# Patient Record
Sex: FEMALE | Race: WHITE | Employment: OTHER | URBAN - METROPOLITAN AREA
[De-identification: names, ages, dates, MRNs, and addresses within clinical notes are randomized per-mention and may not be internally consistent; named-entity substitution may affect disease eponyms.]

---

## 2018-03-25 ENCOUNTER — HOSPITAL ENCOUNTER (OUTPATIENT)
Age: 33
Discharge: HOME | End: 2018-03-25
Attending: PEDIATRICS
Payer: COMMERCIAL

## 2018-03-25 VITALS
DIASTOLIC BLOOD PRESSURE: 94 MMHG | BODY MASS INDEX: 24.16 KG/M2 | SYSTOLIC BLOOD PRESSURE: 127 MMHG | RESPIRATION RATE: 20 BRPM | HEART RATE: 79 BPM | HEIGHT: 65 IN | OXYGEN SATURATION: 99 % | TEMPERATURE: 99 F | WEIGHT: 145 LBS

## 2018-03-25 DIAGNOSIS — M54.2 CERVICALGIA: Primary | ICD-10-CM

## 2018-03-25 PROCEDURE — 99204 OFFICE O/P NEW MOD 45 MIN: CPT

## 2018-03-25 PROCEDURE — 99203 OFFICE O/P NEW LOW 30 MIN: CPT

## 2018-03-25 RX ORDER — BENZONATATE 100 MG/1
100 CAPSULE ORAL 3 TIMES DAILY PRN
COMMUNITY

## 2018-03-25 RX ORDER — METHYLDOPA 250 MG/1
250 TABLET, FILM COATED ORAL 2 TIMES DAILY
COMMUNITY

## 2018-03-25 RX ORDER — MONTELUKAST SODIUM 10 MG/1
10 TABLET ORAL NIGHTLY
COMMUNITY

## 2018-03-25 RX ORDER — CYCLOBENZAPRINE HCL 10 MG
10 TABLET ORAL 3 TIMES DAILY PRN
Qty: 20 TABLET | Refills: 0 | Status: SHIPPED | OUTPATIENT
Start: 2018-03-25 | End: 2018-04-01

## 2018-03-25 NOTE — ED INITIAL ASSESSMENT (HPI)
[facility]  and tested negative for influenza, states having neck pain x 1 week. Patient denies fever at home, productive cough, ear pressure, body aches, increased fatigue. Patient states having sore throat only with cough.   P

## 2018-03-25 NOTE — ED PROVIDER NOTES
Patient Seen in: [address]    History   Patient presents with:  Cough/URI  Neck Pain (musculoskeletal, neurologic)    Stated Complaint: neck pain    HPI    17-year-old female presents [facility] 65.8 [facility]   LMP 03/11/2018 (Approximate)   SpO2 99%   BMI 24.13 kg/m²      GENERAL: NAD  HEAD: normocephalic, atraumatic,   EYES: PERRLA, EOMI, conj sclera clear  THROAT: mmm, no lesions  NECK: supple, no meningeal signs. Full rom.   Tender traps B  LUNGS: n

## 2022-04-12 ENCOUNTER — APPOINTMENT (OUTPATIENT)
Dept: GENERAL RADIOLOGY | Age: 37
End: 2022-04-12
Attending: NURSE PRACTITIONER
Payer: COMMERCIAL

## 2022-04-12 ENCOUNTER — HOSPITAL ENCOUNTER (OUTPATIENT)
Age: 37
Discharge: HOME | End: 2022-04-12
Payer: COMMERCIAL

## 2022-04-12 VITALS
HEIGHT: 65 IN | BODY MASS INDEX: 24.16 KG/M2 | HEART RATE: 72 BPM | DIASTOLIC BLOOD PRESSURE: 92 MMHG | SYSTOLIC BLOOD PRESSURE: 134 MMHG | WEIGHT: 145 LBS | RESPIRATION RATE: 16 BRPM | OXYGEN SATURATION: 100 % | TEMPERATURE: 98 F

## 2022-04-12 DIAGNOSIS — J06.9 UPPER RESPIRATORY VIRUS: Primary | ICD-10-CM

## 2022-04-12 PROCEDURE — 71046 X-RAY EXAM CHEST 2 VIEWS: CPT | Performed by: NURSE PRACTITIONER

## 2022-04-12 PROCEDURE — 99203 OFFICE O/P NEW LOW 30 MIN: CPT | Performed by: NURSE PRACTITIONER

## 2022-04-12 RX ORDER — BENZONATATE 100 MG/1
200 CAPSULE ORAL 3 TIMES DAILY PRN
Qty: 30 CAPSULE | Refills: 0 | Status: SHIPPED | OUTPATIENT
Start: 2022-04-12 | End: 2022-05-12

## 2022-04-12 RX ORDER — LEVOTHYROXINE, LIOTHYRONINE 38; 9 UG/1; UG/1
60 TABLET ORAL
COMMUNITY
Start: 2022-03-28

## 2022-04-12 RX ORDER — LISINOPRIL 10 MG/1
10 TABLET ORAL DAILY
COMMUNITY
Start: 2022-02-10

## 2022-04-12 RX ORDER — ALBUTEROL SULFATE 90 UG/1
2 AEROSOL, METERED RESPIRATORY (INHALATION) EVERY 4 HOURS PRN
Qty: 1 EACH | Refills: 0 | Status: SHIPPED | OUTPATIENT
Start: 2022-04-12 | End: 2022-05-12

## 2022-04-12 NOTE — ED INITIAL ASSESSMENT (HPI)
Patient here with complaints of a cough that goes between dry and productive x 2-3 days. States today she was out walking and developed SOB with the walking. She has had a runny nose with yellow mucus x 1 week and some general body aches. Denies any fevers, chills, N/V/D, ear pain or other symptoms. Both kids have been sick last week and have had fevers and bilateral ear infections. Tylenol PRN, nothing today. She had COVID at [facility] 2021.

## 2022-07-01 NOTE — ED INITIAL ASSESSMENT (HPI)
Pt presents with congestion and sore throat (one day) symptoms starting 6/19/22. Per pt, \"I feel like I had a sinus infection\". Pt now reports right ear pain x 24 hours. No fever.

## 2022-09-15 ENCOUNTER — HOSPITAL ENCOUNTER (OUTPATIENT)
Age: 37
Discharge: HOME | End: 2022-09-15
Payer: COMMERCIAL

## 2022-09-15 VITALS
OXYGEN SATURATION: 100 % | DIASTOLIC BLOOD PRESSURE: 96 MMHG | BODY MASS INDEX: 24.16 KG/M2 | TEMPERATURE: 97 F | WEIGHT: 145 LBS | HEIGHT: 65 IN | SYSTOLIC BLOOD PRESSURE: 127 MMHG | RESPIRATION RATE: 16 BRPM | HEART RATE: 68 BPM

## 2022-09-15 DIAGNOSIS — Z86.79 HISTORY OF HYPERTENSION: ICD-10-CM

## 2022-09-15 DIAGNOSIS — J01.00 ACUTE NON-RECURRENT MAXILLARY SINUSITIS: Primary | ICD-10-CM

## 2022-09-15 DIAGNOSIS — H65.02 NON-RECURRENT ACUTE SEROUS OTITIS MEDIA OF LEFT EAR: ICD-10-CM

## 2022-09-15 RX ORDER — AMOXICILLIN 500 MG/1
500 TABLET, FILM COATED ORAL 2 TIMES DAILY
Qty: 20 TABLET | Refills: 0 | Status: SHIPPED | OUTPATIENT
Start: 2022-09-15 | End: 2022-09-25

## 2022-09-15 RX ORDER — FLUTICASONE PROPIONATE 50 MCG
2 SPRAY, SUSPENSION (ML) NASAL DAILY
Qty: 16 G | Refills: 0 | Status: SHIPPED | OUTPATIENT
Start: 2022-09-15 | End: 2022-10-15

## 2022-12-04 ENCOUNTER — HOSPITAL ENCOUNTER (OUTPATIENT)
Age: 37
Discharge: HOME | End: 2022-12-04
Payer: COMMERCIAL

## 2022-12-04 VITALS
DIASTOLIC BLOOD PRESSURE: 96 MMHG | TEMPERATURE: 98 F | HEART RATE: 71 BPM | SYSTOLIC BLOOD PRESSURE: 132 MMHG | RESPIRATION RATE: 16 BRPM | OXYGEN SATURATION: 99 %

## 2022-12-04 DIAGNOSIS — J01.90 ACUTE NON-RECURRENT SINUSITIS, UNSPECIFIED LOCATION: Primary | ICD-10-CM

## 2022-12-04 RX ORDER — AMOXICILLIN AND CLAVULANATE POTASSIUM 875; 125 MG/1; MG/1
1 TABLET, FILM COATED ORAL 2 TIMES DAILY
Qty: 14 TABLET | Refills: 0 | Status: SHIPPED | OUTPATIENT
Start: 2022-12-04 | End: 2022-12-11

## 2022-12-04 RX ORDER — FLUTICASONE PROPIONATE 50 MCG
1 SPRAY, SUSPENSION (ML) NASAL 2 TIMES DAILY
Qty: 1 EACH | Refills: 0 | Status: SHIPPED | OUTPATIENT
Start: 2022-12-04 | End: 2022-12-18

## 2022-12-04 NOTE — ED INITIAL ASSESSMENT (HPI)
Pt had flu like s/s last week and is feeling better from that but still having right eye, head and ear congestion - only on right side. Denies temp.

## 2023-12-28 ENCOUNTER — HOSPITAL ENCOUNTER (OUTPATIENT)
Age: 38
Discharge: HOME | End: 2023-12-28
Payer: COMMERCIAL

## 2023-12-28 VITALS
SYSTOLIC BLOOD PRESSURE: 126 MMHG | TEMPERATURE: 100 F | DIASTOLIC BLOOD PRESSURE: 85 MMHG | OXYGEN SATURATION: 100 % | RESPIRATION RATE: 18 BRPM | HEART RATE: 85 BPM

## 2023-12-28 DIAGNOSIS — R50.9 FEVER: Primary | ICD-10-CM

## 2023-12-28 DIAGNOSIS — H66.001 NON-RECURRENT ACUTE SUPPURATIVE OTITIS MEDIA OF RIGHT EAR WITHOUT SPONTANEOUS RUPTURE OF TYMPANIC MEMBRANE: ICD-10-CM

## 2023-12-28 LAB
POCT INFLUENZA A: NEGATIVE
POCT INFLUENZA B: NEGATIVE

## 2023-12-28 PROCEDURE — 87502 INFLUENZA DNA AMP PROBE: CPT | Performed by: NURSE PRACTITIONER

## 2023-12-28 PROCEDURE — 99213 OFFICE O/P EST LOW 20 MIN: CPT | Performed by: NURSE PRACTITIONER

## 2023-12-28 RX ORDER — AMOXICILLIN 875 MG/1
875 TABLET, COATED ORAL 2 TIMES DAILY
Qty: 14 TABLET | Refills: 0 | Status: SHIPPED | OUTPATIENT
Start: 2023-12-28 | End: 2024-01-04

## 2023-12-29 NOTE — DISCHARGE INSTRUCTIONS
Follow up with your doctor as needed. Take Amoxicillin two times a day for 7 days for ear infection. [place] diet, increase water intake; gatorade or pedialyte   Runny Nose/Congestion:  Humidifier at bedside   Vicks vaporub under nose and chest wall  - Nasal Rinse ([place] Pot)  - Flonase  - Antihistamine (Allegra, Zyrtec, Claritin)  - Nasal Decongestant (Sudafed); if you have high blood pressure max use 2 days  Cough:  - Mucinex OR Robitussin  - Warm tea w/honey  - Humidifier in bedroom at night  Sore Throat:  - Salt water gargle  - Ibuprofen every 6-8 hours as needed for pain  Fever:  Tylenol or Motrin every 6 hours for fever > 100.4. You can alternate between the two as well if fever high. Follow up with your primary care provider in the next 2-3 days. Go to the emergency room with:  - Fever > 102 that does not respond to medications. - Shortness of breath, difficulty breathing.  - Chest pain.   - Vomiting and unable to keep down fluids or medications

## 2024-01-16 ENCOUNTER — HOSPITAL ENCOUNTER (OUTPATIENT)
Age: 39
Discharge: HOME | End: 2024-01-16
Payer: COMMERCIAL

## 2024-01-16 VITALS
TEMPERATURE: 98 F | DIASTOLIC BLOOD PRESSURE: 97 MMHG | OXYGEN SATURATION: 100 % | SYSTOLIC BLOOD PRESSURE: 125 MMHG | HEART RATE: 67 BPM | RESPIRATION RATE: 18 BRPM

## 2024-01-16 DIAGNOSIS — H92.01 RIGHT EAR PAIN: Primary | ICD-10-CM

## 2024-01-16 PROCEDURE — 99213 OFFICE O/P EST LOW 20 MIN: CPT | Performed by: NURSE PRACTITIONER

## 2024-01-17 NOTE — ED PROVIDER NOTES
Patient Seen in: [facility]      History   No chief complaint on file.    Stated Complaint: ear issue    Subjective:   HPI    38-year-old female presents to immediate care with complaints of right ear pain.  Patient states that she was recently treated for an ear infection in that ear.  She states she finished a course of amoxicillin last week.  She states she has a lot of nasal congestion especially in the morning.  She describes a feeling of fullness in her right ear.    Objective:   No pertinent past medical history.            No pertinent past surgical history.              No pertinent social history.            Review of Systems    Positive for stated complaint: ear issue  Other systems are as noted in HPI.  Constitutional and vital signs reviewed.      All other systems reviewed and negative except as noted above.    Physical Exam     ED Triage Vitals [01/16/24 1857]   BP (!) 125/97   Pulse 67   Resp 18   Temp 97.9 °F (36.6 °C)   Temp src Oral   SpO2 100 %   O2 Device None (Room air)       Current:BP (!) 125/97   Pulse 67   Temp 97.9 °F (36.6 °C) (Oral)   Resp 18   LMP 12/10/2023 (Approximate)   SpO2 100%         Physical Exam  Vitals reviewed.   Constitutional:       General: She is not in acute distress.     Appearance: Normal appearance. She is not ill-appearing.   HENT:      Right Ear: Tympanic membrane, ear canal and external ear normal.      Left Ear: Tympanic membrane, ear canal and external ear normal.      Nose: Congestion present. No rhinorrhea.      Mouth/Throat:      Mouth: Mucous membranes are moist.   Cardiovascular:      Rate and Rhythm: Normal rate and regular rhythm.   Pulmonary:      Effort: Pulmonary effort is normal.      Breath sounds: Normal breath sounds.   Musculoskeletal:         General: Normal range of motion.      Cervical back: Normal range of motion and neck supple.   Lymphadenopathy:      Cervical: No cervical adenopathy.   Skin:     General: Skin is warm  and dry.   Neurological:      General: No focal deficit present.      Mental Status: She is alert and oriented to person, place, and time.   Psychiatric:         Mood and Affect: Mood normal.         Behavior: Behavior normal.               ED Course   Labs Reviewed - No data to display                   MDM                                         Medical Decision Making  2-year-old female presents to immediate care with complaints of right ear pain times past few days.  Patient states she was treated for an ear infection and finished a course of amoxicillin last week.  She has been afebrile.  Differential diagnosis includes otitis media, otitis externa, otalgia.  Eustachian tube dysfunction.  On exam there is no erythema of either TM.  This is most consistent with eustachian tube dysfunction or otalgia.  Patient was instructed to use Flonase nasal spray and add an antihistamine such as Claritin or Allegra.  She was given information on this condition.    Risk  OTC drugs.        Disposition and Plan     Clinical Impression:  1. Right ear pain         Disposition:  Discharge  1/16/2024  7:07 pm    Follow-up:  Randy Viveros  [address]  [facility] [address]  [phone]      If symptoms worsen          Medications Prescribed:  Current Discharge Medication List

## 2024-09-16 ENCOUNTER — APPOINTMENT (OUTPATIENT)
Dept: GENERAL RADIOLOGY | Age: 39
End: 2024-09-16
Attending: NURSE PRACTITIONER
Payer: COMMERCIAL

## 2024-09-16 ENCOUNTER — HOSPITAL ENCOUNTER (OUTPATIENT)
Age: 39
Discharge: HOME | End: 2024-09-16
Payer: COMMERCIAL

## 2024-09-16 ENCOUNTER — APPOINTMENT (OUTPATIENT)
Dept: ULTRASOUND IMAGING | Facility: HOSPITAL | Age: 39
End: 2024-09-16
Attending: NURSE PRACTITIONER
Payer: COMMERCIAL

## 2024-09-16 VITALS
OXYGEN SATURATION: 100 % | DIASTOLIC BLOOD PRESSURE: 98 MMHG | TEMPERATURE: 98 F | RESPIRATION RATE: 16 BRPM | HEART RATE: 75 BPM | SYSTOLIC BLOOD PRESSURE: 147 MMHG

## 2024-09-16 DIAGNOSIS — M79.662 PAIN IN LEFT LOWER LEG: Primary | ICD-10-CM

## 2024-09-16 PROCEDURE — 99213 OFFICE O/P EST LOW 20 MIN: CPT | Performed by: NURSE PRACTITIONER

## 2024-09-16 PROCEDURE — 73590 X-RAY EXAM OF LOWER LEG: CPT | Performed by: NURSE PRACTITIONER

## 2024-09-16 PROCEDURE — 93971 EXTREMITY STUDY: CPT | Performed by: NURSE PRACTITIONER

## 2024-09-16 RX ORDER — CHOLECALCIFEROL (VITAMIN D3) 50 MCG
TABLET ORAL
COMMUNITY

## 2024-09-16 RX ORDER — MULTIVITAMIN
TABLET ORAL
COMMUNITY

## 2024-09-16 RX ORDER — CETIRIZINE HYDROCHLORIDE 10 MG/1
10 TABLET ORAL DAILY
COMMUNITY

## 2024-09-16 NOTE — ED PROVIDER NOTES
Patient Seen in: [facility]      History     Chief Complaint   Patient presents with    Leg Pain     Stated Complaint: left ankle pain    Subjective:   HPI    39 yr old female here for evaluation of left lower leg redness, pain that stared while at dinner yesterday. She reports no falls, trauma, excessive running or new exercises in the day or two before pain starting. She denies any numbness or tingling to leg or foot. Reports feeling tightness, pulling in thigh this morning. She worked out this morning with no increased pain or disability. She denies previous injury or surgery to this leg in past. She is on hormone therapy at this time. Denies hx dvt/pe, blood thinner use, recent travel, immobilization or surgery, smoking.    Objective:   Past Medical History:    Cytomegalovirus (HCC)    Fernie Barr infection    Essential hypertension    Heart murmur    Preeclampsia (HCC)              Past Surgical History:   Procedure Laterality Date          Reduction of large breast                  Social History     Socioeconomic History    Marital status:    Tobacco Use    Smoking status: Never    Smokeless tobacco: Never   Vaping Use    Vaping status: Never Used   Substance and Sexual Activity    Alcohol use: Yes     Comment: occ    Drug use: Not Currently     Types: Cannabis   Social History Narrative    ** Merged History Encounter **          Social Determinants of Health      Received from [employer], [employer]    [facility]              Review of Systems    Positive for stated Chief Complaint: Leg Pain    Other systems are as noted in HPI.  Constitutional and vital signs reviewed.      All other systems reviewed and negative except as noted above.    Physical Exam     ED Triage Vitals [24 0904]   BP (!) 147/98   Pulse 75   Resp 16   Temp 98.1 °F (36.7 °C)   Temp src Temporal   SpO2 100 %   O2 Device None (Room air)       Current Vitals:   Vital Signs  BP: (!) 147/98  Pulse:  75  Resp: 16  Temp: 98.1 °F (36.7 °C)  Temp src: Temporal    Oxygen Therapy  SpO2: 100 %  O2 Device: None (Room air)            Physical Exam  Vitals and nursing note reviewed.   Constitutional:       General: She is not in acute distress.     Appearance: Normal appearance. She is not ill-appearing, toxic-appearing or diaphoretic.   Cardiovascular:      Rate and Rhythm: Normal rate.      Pulses: Normal pulses.   Pulmonary:      Effort: Pulmonary effort is normal. No respiratory distress.   Musculoskeletal:      Right lower leg: No swelling, deformity, lacerations, tenderness or bony tenderness. No edema.      Left lower leg: Tenderness present. No swelling, deformity, lacerations or bony tenderness. No edema.        Legs:       Comments: Tenderness to touch, scant redness to skin. Full ROM to ankle joint with no swelling or deformity noted   Skin:     General: Skin is warm and dry.      Capillary Refill: Capillary refill takes less than 2 seconds.   Neurological:      Mental Status: She is alert and oriented to person, place, and time.   Psychiatric:         Mood and Affect: Mood normal.         Behavior: Behavior normal.               ED Course   Labs Reviewed - No data to display       ED Course as of 09/16/24 1143  ------------------------------------------------------------  Time: 09/16 0950  Value: XR TIBIA + FIBULA (2 VIEWS), LEFT (CPT=73590)  Comment:    Impression  CONCLUSION: Normal examination.    ------------------------------------------------------------  Time: 09/16 1135  Value: US VENOUS DOPPLER LEG LEFT - DIAG IMG (CPT=93971)  Comment: Impression  CONCLUSION: Normal examination.                MDM     39 yr old female here for evaluation of left lower leg redness, tenderness since last night. No fall or trauma, heavy workout.    ON exam, pt well appearing, small area of anterior left lower, distal lge with scant redness. This is not circumferential, and there is no swelling compared to right leg. It  is tender to touch. Full Rom to toes, ankle and knee. When flexing ankle and stepping pain to area of concern. PT also reports pulling to post thigh. No obvious redness, swelling.    Differential diagnoses reflecting the complexity of care include but are not limited to DVT, tendonitis, contusion, shin splint, .    Comorbidities that add complexity to management include: hormone therapy  History obtained by an independent source was from: patient  My independent interpretations of studies include: XR tib/fib= negative for fracture or dislocation.             US doppler DVT= NEGATIVE    Shared decision making was done by: patient myself  Discussions of management was done with: patient    Patient is well appearing, non-toxic and in no acute distress.  Vital signs are stable.   Discussed XR results. Will send to [facility] for US dopper r/o dvt. Pt agrees and stable to main for exam at 0956    US doppler= NEG  Discussed results with pt. Advised on possible tendonitis, shin splint causing pain. PO fluids, ice, ace wrap, ibuprofen, being mindful of movement with walks.  FU with pcp if symptoms persist > 2 weeks.    All questions answered. Return and ER precautions given.    Counseled: Patient, regarding diagnosis, regarding treatment plan, regarding diagnostic results, regarding prescription, I have discussed with the patient the results of tests, differential diagnosis, and warning signs and symptoms that should prompt immediate return. The patient understands these instructions and agrees to the follow-up plan provided. There is no barriers to learning. Appropriate f/u given. Patient agrees to return for any concerns/ problems/complications.                                           Medical Decision Making      Disposition and Plan     Clinical Impression:  1. Pain in left lower leg         Disposition:  Discharge  9/16/2024 11:42 am    Follow-up:  Randy Viveros  [address]  [address]  [phone]    Schedule an appointment as soon as possible for a visit in 2 weeks            Medications Prescribed:  Current Discharge Medication List

## 2024-09-16 NOTE — DISCHARGE INSTRUCTIONS
Avoid heavy strenuous running, walking, up hill or down hill for the next week while your leg pain resolves.    ICE 15 min on 2 hours off for the next few days.  Ibuprofen 600mg every 6 hours for pain and inflammation of tendon/ligaments in leg.  ACE wrap is a compression wrap this can help with swelling and help with pain when walking. Use it daily around ankle and lower leg. Take off for shower/bedtime.    See your primary care provider in 2 weeks if symptoms persist. Further eval by orthopedics or other imaging may be warranted.

## 2024-10-19 ENCOUNTER — HOSPITAL ENCOUNTER (OUTPATIENT)
Age: 39
Discharge: HOME | End: 2024-10-19
Payer: COMMERCIAL

## 2024-10-19 VITALS
OXYGEN SATURATION: 100 % | TEMPERATURE: 99 F | HEART RATE: 88 BPM | SYSTOLIC BLOOD PRESSURE: 120 MMHG | RESPIRATION RATE: 20 BRPM | DIASTOLIC BLOOD PRESSURE: 89 MMHG

## 2024-10-19 DIAGNOSIS — R51.9 ACUTE NONINTRACTABLE HEADACHE, UNSPECIFIED HEADACHE TYPE: ICD-10-CM

## 2024-10-19 DIAGNOSIS — R50.9 FEVER: Primary | ICD-10-CM

## 2024-10-19 LAB
POCT INFLUENZA A: NEGATIVE
POCT INFLUENZA B: NEGATIVE
SARS-COV-2 RNA RESP QL NAA+PROBE: NOT DETECTED

## 2024-10-19 PROCEDURE — 87502 INFLUENZA DNA AMP PROBE: CPT | Performed by: NURSE PRACTITIONER

## 2024-10-19 PROCEDURE — 99214 OFFICE O/P EST MOD 30 MIN: CPT | Performed by: NURSE PRACTITIONER

## 2024-10-19 PROCEDURE — U0002 COVID-19 LAB TEST NON-CDC: HCPCS | Performed by: NURSE PRACTITIONER

## 2024-10-19 RX ORDER — NAPROXEN 500 MG/1
500 TABLET ORAL 2 TIMES DAILY PRN
Qty: 20 TABLET | Refills: 0 | Status: SHIPPED | OUTPATIENT
Start: 2024-10-19 | End: 2024-10-29

## 2024-10-19 RX ORDER — ONDANSETRON 4 MG/1
4 TABLET, ORALLY DISINTEGRATING ORAL EVERY 4 HOURS PRN
Qty: 10 TABLET | Refills: 0 | Status: SHIPPED | OUTPATIENT
Start: 2024-10-19 | End: 2024-10-26

## 2024-10-19 NOTE — ED PROVIDER NOTES
Patient Seen in: [facility] [place]      History     Chief Complaint   Patient presents with    Fever     Stated Complaint: Fever    Subjective:   HPI  Patient is an 39-year-old female that presents to the immediate care center today with concern for fever, chills, congestion, headache, and nausea that started 2 days ago. Pt denies known illness exposure.  She has been eating and drinking less but is able without difficulty.  She has had no shortness of air; no abdominal or back pain; no dizziness.          Objective:     Past Medical History:    Cytomegalovirus (HCC)    Fernie Barr infection    Essential hypertension    Heart murmur    Preeclampsia (HCC)              Past Surgical History:   Procedure Laterality Date          Reduction of large breast                  Social History     Socioeconomic History    Marital status:    Tobacco Use    Smoking status: Never    Smokeless tobacco: Never   Vaping Use    Vaping status: Never Used   Substance and Sexual Activity    Alcohol use: Yes     Comment: occ    Drug use: Not Currently     Types: Cannabis   Social History Narrative    ** Merged History Encounter **          Social Drivers of Health      [facility] [employer]              Review of Systems   Constitutional:  Positive for appetite change, fatigue and fever.   HENT:  Positive for congestion. Negative for sore throat.    Respiratory:  Positive for cough (\"kind of but not really\"). Negative for shortness of breath.    Cardiovascular:  Negative for chest pain.   Gastrointestinal:  Positive for nausea. Negative for abdominal pain, diarrhea and vomiting.   Genitourinary:  Negative for flank pain.   Musculoskeletal:  Positive for back pain (Chronic, no recent changes, she states this feels typical and believes it is not related to her illness.) and myalgias. Negative for neck pain and neck stiffness.   Skin:  Negative for rash.   Neurological:  Positive for  headaches. Negative for dizziness and weakness.       Positive for stated complaint: Fever  Other systems are as noted in HPI.  Constitutional and vital signs reviewed.      All other systems reviewed and negative except as noted above.    Physical Exam     ED Triage Vitals [10/19/24 0808]   /89   Pulse 88   Resp 20   Temp 98.8 °F (37.1 °C)   Temp src Oral   SpO2 100 %   O2 Device None (Room air)       Current Vitals:   Vital Signs  BP: 120/89  Pulse: 88  Resp: 20  Temp: 98.8 °F (37.1 °C)  Temp src: Oral    Oxygen Therapy  SpO2: 100 %  O2 Device: None (Room air)        Physical Exam  Vitals and nursing note reviewed.   Constitutional:       General: She is not in acute distress.     Appearance: She is not ill-appearing.   HENT:      Right Ear: Tympanic membrane and ear canal normal.      Left Ear: Tympanic membrane and ear canal normal.      Nose: Nose normal.   Eyes:      Conjunctiva/sclera: Conjunctivae normal.   Neck:      Meningeal: Brudzinski's sign and Kernig's sign absent.   Pulmonary:      Effort: Pulmonary effort is normal. No respiratory distress.      Breath sounds: Normal breath sounds.   Musculoskeletal:      Cervical back: Normal range of motion and neck supple.   Skin:     General: Skin is warm and dry.      Findings: No rash.   Neurological:      Mental Status: She is alert and oriented to person, place, and time.      GCS: GCS eye subscore is 4. GCS verbal subscore is 5. GCS motor subscore is 6.      Cranial Nerves: No dysarthria or facial asymmetry.      Motor: No weakness.      Coordination: Coordination normal.      Gait: Gait normal.             ED Course     Labs Reviewed   POCT FLU TEST - Normal    Narrative:     This assay is a rapid molecular in vitro test utilizing nucleic acid amplification of influenza A and B viral RNA.   RAPID SARS-COV-2 BY PCR - Normal                   MDM      Differential diagnoses (listed below) were reviewed with patient at bedside prior to disposition.  She  was reassured by her negative COVID and flu test here.  However, she was informed that definitive diagnosis has not been established.  It is presumed because of her cluster of symptoms, that she has a viral illness.  We discussed that meningitis is in her list of differential diagnoses.  She was also reassured by her negative Kernig's and Brudzinski sign.  However, she was informed that these are not definitive and excluding meningitis.  She was informed of limited resources in the immediate care center to consider meningitis, unable to perform lumbar puncture here.  Patient will monitor symptoms closely at home.  She was given prescriptions for Zofran and naproxen to take as directed.  She will push oral fluids at home.  She was encouraged to have close follow-up with her primary care provider if her symptoms remain constant but are not worsening.  If it anytime she have worsening symptoms: Worsening headache, neck stiffness, persistent vomiting, weakness, lethargy, chest pain or shortness of breath, she must seek prompt reevaluation in the emergency department.  She states understanding and agrees with plan.          Medical Decision Making  Differential diagnoses considered today include, but are not exclusive of: Acute otitis media, strep pharyngitis, pneumonia, acute abdominal infection, acute urinary tract infection, viral illness including possible COVID-19 infection and influenza, meningitis.            Problems Addressed:  Acute nonintractable headache, unspecified headache type: undiagnosed new problem with uncertain prognosis  Fever: undiagnosed new problem with uncertain prognosis    Amount and/or Complexity of Data Reviewed  Labs:  Decision-making details documented in ED Course.    Risk  OTC drugs.  Prescription drug management.        Disposition and Plan     Clinical Impression:  1. Fever    2. Acute nonintractable headache, unspecified headache type         Disposition:  Discharge  10/19/2024  8:52  am    Follow-up:  Randy Viveros  [address]  [facility] [address]  [phone]      As needed          Medications Prescribed:  Discharge Medication List as of 10/19/2024  8:56 AM        START taking these medications    Details   naproxen 500 MG Oral Tab Take 1 tablet (500 mg total) by mouth 2 (two) times daily as needed., Normal, Disp-20 tablet, R-0      ondansetron 4 MG Oral Tablet Dispersible Take 1 tablet (4 mg total) by mouth every 4 (four) hours as needed for Nausea., Normal, Disp-10 tablet, R-0                 Supplementary Documentation: